# Patient Record
Sex: MALE | Race: WHITE | NOT HISPANIC OR LATINO | URBAN - METROPOLITAN AREA
[De-identification: names, ages, dates, MRNs, and addresses within clinical notes are randomized per-mention and may not be internally consistent; named-entity substitution may affect disease eponyms.]

---

## 2017-03-28 ENCOUNTER — EMERGENCY (EMERGENCY)
Facility: HOSPITAL | Age: 77
LOS: 0 days | Discharge: HOME | End: 2017-03-28

## 2017-04-05 PROBLEM — Z00.00 ENCOUNTER FOR PREVENTIVE HEALTH EXAMINATION: Status: ACTIVE | Noted: 2017-04-05

## 2017-05-25 ENCOUNTER — APPOINTMENT (OUTPATIENT)
Dept: OTOLARYNGOLOGY | Facility: CLINIC | Age: 77
End: 2017-05-25

## 2017-06-27 DIAGNOSIS — R27.0 ATAXIA, UNSPECIFIED: ICD-10-CM

## 2017-06-27 DIAGNOSIS — R47.89 OTHER SPEECH DISTURBANCES: ICD-10-CM

## 2017-06-27 DIAGNOSIS — E11.9 TYPE 2 DIABETES MELLITUS WITHOUT COMPLICATIONS: ICD-10-CM

## 2017-06-27 DIAGNOSIS — Z79.84 LONG TERM (CURRENT) USE OF ORAL HYPOGLYCEMIC DRUGS: ICD-10-CM

## 2017-06-27 DIAGNOSIS — E78.5 HYPERLIPIDEMIA, UNSPECIFIED: ICD-10-CM

## 2017-06-27 DIAGNOSIS — Z86.73 PERSONAL HISTORY OF TRANSIENT ISCHEMIC ATTACK (TIA), AND CEREBRAL INFARCTION WITHOUT RESIDUAL DEFICITS: ICD-10-CM

## 2017-06-27 DIAGNOSIS — Z79.82 LONG TERM (CURRENT) USE OF ASPIRIN: ICD-10-CM

## 2017-06-27 DIAGNOSIS — Z88.0 ALLERGY STATUS TO PENICILLIN: ICD-10-CM

## 2017-06-27 DIAGNOSIS — J32.9 CHRONIC SINUSITIS, UNSPECIFIED: ICD-10-CM

## 2017-06-27 DIAGNOSIS — N40.0 BENIGN PROSTATIC HYPERPLASIA WITHOUT LOWER URINARY TRACT SYMPTOMS: ICD-10-CM

## 2017-10-26 ENCOUNTER — OUTPATIENT (OUTPATIENT)
Dept: OUTPATIENT SERVICES | Facility: HOSPITAL | Age: 77
LOS: 1 days | Discharge: HOME | End: 2017-10-26

## 2017-10-26 DIAGNOSIS — E66.3 OVERWEIGHT: ICD-10-CM

## 2017-10-26 DIAGNOSIS — E78.5 HYPERLIPIDEMIA, UNSPECIFIED: ICD-10-CM

## 2017-10-26 DIAGNOSIS — M13.0 POLYARTHRITIS, UNSPECIFIED: ICD-10-CM

## 2017-10-26 DIAGNOSIS — F41.9 ANXIETY DISORDER, UNSPECIFIED: ICD-10-CM

## 2017-10-26 DIAGNOSIS — N40.0 BENIGN PROSTATIC HYPERPLASIA WITHOUT LOWER URINARY TRACT SYMPTOMS: ICD-10-CM

## 2017-10-26 DIAGNOSIS — E11.9 TYPE 2 DIABETES MELLITUS WITHOUT COMPLICATIONS: ICD-10-CM

## 2018-10-18 ENCOUNTER — OUTPATIENT (OUTPATIENT)
Dept: OUTPATIENT SERVICES | Facility: HOSPITAL | Age: 78
LOS: 1 days | Discharge: HOME | End: 2018-10-18

## 2018-10-18 DIAGNOSIS — E11.9 TYPE 2 DIABETES MELLITUS WITHOUT COMPLICATIONS: ICD-10-CM

## 2018-10-18 DIAGNOSIS — N40.0 BENIGN PROSTATIC HYPERPLASIA WITHOUT LOWER URINARY TRACT SYMPTOMS: ICD-10-CM

## 2018-10-18 DIAGNOSIS — E55.9 VITAMIN D DEFICIENCY, UNSPECIFIED: ICD-10-CM

## 2018-10-18 DIAGNOSIS — E66.3 OVERWEIGHT: ICD-10-CM

## 2018-10-18 DIAGNOSIS — E53.8 DEFICIENCY OF OTHER SPECIFIED B GROUP VITAMINS: ICD-10-CM

## 2018-10-18 DIAGNOSIS — Z00.01 ENCOUNTER FOR GENERAL ADULT MEDICAL EXAMINATION WITH ABNORMAL FINDINGS: ICD-10-CM

## 2019-07-23 ENCOUNTER — OUTPATIENT (OUTPATIENT)
Dept: OUTPATIENT SERVICES | Facility: HOSPITAL | Age: 79
LOS: 1 days | Discharge: HOME | End: 2019-07-23

## 2019-07-23 DIAGNOSIS — Z12.5 ENCOUNTER FOR SCREENING FOR MALIGNANT NEOPLASM OF PROSTATE: ICD-10-CM

## 2021-02-28 ENCOUNTER — TRANSCRIPTION ENCOUNTER (OUTPATIENT)
Age: 81
End: 2021-02-28

## 2022-04-29 ENCOUNTER — INPATIENT (INPATIENT)
Facility: HOSPITAL | Age: 82
LOS: 1 days | Discharge: HOME | End: 2022-05-01
Attending: INTERNAL MEDICINE | Admitting: INTERNAL MEDICINE
Payer: MEDICARE

## 2022-04-29 VITALS
HEART RATE: 120 BPM | TEMPERATURE: 99 F | SYSTOLIC BLOOD PRESSURE: 114 MMHG | OXYGEN SATURATION: 88 % | RESPIRATION RATE: 18 BRPM | DIASTOLIC BLOOD PRESSURE: 56 MMHG | WEIGHT: 240.08 LBS | HEIGHT: 72 IN

## 2022-04-29 DIAGNOSIS — F41.9 ANXIETY DISORDER, UNSPECIFIED: ICD-10-CM

## 2022-04-29 DIAGNOSIS — J18.9 PNEUMONIA, UNSPECIFIED ORGANISM: ICD-10-CM

## 2022-04-29 DIAGNOSIS — N17.9 ACUTE KIDNEY FAILURE, UNSPECIFIED: ICD-10-CM

## 2022-04-29 DIAGNOSIS — Z90.89 ACQUIRED ABSENCE OF OTHER ORGANS: Chronic | ICD-10-CM

## 2022-04-29 DIAGNOSIS — E78.5 HYPERLIPIDEMIA, UNSPECIFIED: ICD-10-CM

## 2022-04-29 DIAGNOSIS — N40.0 BENIGN PROSTATIC HYPERPLASIA WITHOUT LOWER URINARY TRACT SYMPTOMS: ICD-10-CM

## 2022-04-29 DIAGNOSIS — E11.9 TYPE 2 DIABETES MELLITUS WITHOUT COMPLICATIONS: ICD-10-CM

## 2022-04-29 LAB
ALBUMIN SERPL ELPH-MCNC: 4.6 G/DL — SIGNIFICANT CHANGE UP (ref 3.5–5.2)
ALP SERPL-CCNC: 37 U/L — SIGNIFICANT CHANGE UP (ref 30–115)
ALT FLD-CCNC: 34 U/L — SIGNIFICANT CHANGE UP (ref 0–41)
ANION GAP SERPL CALC-SCNC: 17 MMOL/L — HIGH (ref 7–14)
APPEARANCE UR: CLEAR — SIGNIFICANT CHANGE UP
APTT BLD: 36.1 SEC — SIGNIFICANT CHANGE UP (ref 27–39.2)
AST SERPL-CCNC: 47 U/L — HIGH (ref 0–41)
BASE EXCESS BLDV CALC-SCNC: -3.3 MMOL/L — LOW (ref -2–3)
BASOPHILS # BLD AUTO: 0.03 K/UL — SIGNIFICANT CHANGE UP (ref 0–0.2)
BASOPHILS NFR BLD AUTO: 0.2 % — SIGNIFICANT CHANGE UP (ref 0–1)
BILIRUB SERPL-MCNC: 0.5 MG/DL — SIGNIFICANT CHANGE UP (ref 0.2–1.2)
BILIRUB UR-MCNC: NEGATIVE — SIGNIFICANT CHANGE UP
BUN SERPL-MCNC: 28 MG/DL — HIGH (ref 10–20)
CA-I SERPL-SCNC: 1.25 MMOL/L — SIGNIFICANT CHANGE UP (ref 1.15–1.33)
CALCIUM SERPL-MCNC: 10 MG/DL — SIGNIFICANT CHANGE UP (ref 8.5–10.1)
CHLORIDE SERPL-SCNC: 102 MMOL/L — SIGNIFICANT CHANGE UP (ref 98–110)
CO2 SERPL-SCNC: 21 MMOL/L — SIGNIFICANT CHANGE UP (ref 17–32)
COLOR SPEC: YELLOW — SIGNIFICANT CHANGE UP
CREAT SERPL-MCNC: 2 MG/DL — HIGH (ref 0.7–1.5)
DIFF PNL FLD: NEGATIVE — SIGNIFICANT CHANGE UP
EGFR: 33 ML/MIN/1.73M2 — LOW
EOSINOPHIL # BLD AUTO: 0.01 K/UL — SIGNIFICANT CHANGE UP (ref 0–0.7)
EOSINOPHIL NFR BLD AUTO: 0.1 % — SIGNIFICANT CHANGE UP (ref 0–8)
FLUAV AG NPH QL: SIGNIFICANT CHANGE UP
FLUBV AG NPH QL: SIGNIFICANT CHANGE UP
GAS PNL BLDV: 135 MMOL/L — LOW (ref 136–145)
GAS PNL BLDV: SIGNIFICANT CHANGE UP
GLUCOSE BLDC GLUCOMTR-MCNC: 107 MG/DL — HIGH (ref 70–99)
GLUCOSE BLDC GLUCOMTR-MCNC: 132 MG/DL — HIGH (ref 70–99)
GLUCOSE SERPL-MCNC: 182 MG/DL — HIGH (ref 70–99)
GLUCOSE UR QL: 250 MG/DL
HCO3 BLDV-SCNC: 23 MMOL/L — SIGNIFICANT CHANGE UP (ref 22–29)
HCT VFR BLD CALC: 46.4 % — SIGNIFICANT CHANGE UP (ref 42–52)
HCT VFR BLDA CALC: 49 % — SIGNIFICANT CHANGE UP (ref 39–51)
HGB BLD CALC-MCNC: 16.2 G/DL — SIGNIFICANT CHANGE UP (ref 12.6–17.4)
HGB BLD-MCNC: 16.1 G/DL — SIGNIFICANT CHANGE UP (ref 14–18)
IMM GRANULOCYTES NFR BLD AUTO: 0.6 % — HIGH (ref 0.1–0.3)
INR BLD: 1.15 RATIO — SIGNIFICANT CHANGE UP (ref 0.65–1.3)
KETONES UR-MCNC: NEGATIVE — SIGNIFICANT CHANGE UP
LACTATE BLDV-MCNC: 5.3 MMOL/L — CRITICAL HIGH (ref 0.5–2)
LACTATE SERPL-SCNC: 3.9 MMOL/L — HIGH (ref 0.7–2)
LACTATE SERPL-SCNC: 4.5 MMOL/L — CRITICAL HIGH (ref 0.7–2)
LEUKOCYTE ESTERASE UR-ACNC: NEGATIVE — SIGNIFICANT CHANGE UP
LYMPHOCYTES # BLD AUTO: 0.84 K/UL — LOW (ref 1.2–3.4)
LYMPHOCYTES # BLD AUTO: 6.3 % — LOW (ref 20.5–51.1)
MCHC RBC-ENTMCNC: 31.3 PG — HIGH (ref 27–31)
MCHC RBC-ENTMCNC: 34.7 G/DL — SIGNIFICANT CHANGE UP (ref 32–37)
MCV RBC AUTO: 90.3 FL — SIGNIFICANT CHANGE UP (ref 80–94)
MONOCYTES # BLD AUTO: 1.28 K/UL — HIGH (ref 0.1–0.6)
MONOCYTES NFR BLD AUTO: 9.6 % — HIGH (ref 1.7–9.3)
NEUTROPHILS # BLD AUTO: 11.11 K/UL — HIGH (ref 1.4–6.5)
NEUTROPHILS NFR BLD AUTO: 83.2 % — HIGH (ref 42.2–75.2)
NITRITE UR-MCNC: NEGATIVE — SIGNIFICANT CHANGE UP
NRBC # BLD: 0 /100 WBCS — SIGNIFICANT CHANGE UP (ref 0–0)
PCO2 BLDV: 43 MMHG — SIGNIFICANT CHANGE UP (ref 42–55)
PH BLDV: 7.33 — SIGNIFICANT CHANGE UP (ref 7.32–7.43)
PH UR: 6 — SIGNIFICANT CHANGE UP (ref 5–8)
PLATELET # BLD AUTO: 308 K/UL — SIGNIFICANT CHANGE UP (ref 130–400)
PO2 BLDV: 32 MMHG — SIGNIFICANT CHANGE UP
POTASSIUM BLDV-SCNC: 4.3 MMOL/L — SIGNIFICANT CHANGE UP (ref 3.5–5.1)
POTASSIUM SERPL-MCNC: 4.8 MMOL/L — SIGNIFICANT CHANGE UP (ref 3.5–5)
POTASSIUM SERPL-SCNC: 4.8 MMOL/L — SIGNIFICANT CHANGE UP (ref 3.5–5)
PROT SERPL-MCNC: 7.4 G/DL — SIGNIFICANT CHANGE UP (ref 6–8)
PROT UR-MCNC: NEGATIVE MG/DL — SIGNIFICANT CHANGE UP
PROTHROM AB SERPL-ACNC: 13.2 SEC — HIGH (ref 9.95–12.87)
RBC # BLD: 5.14 M/UL — SIGNIFICANT CHANGE UP (ref 4.7–6.1)
RBC # FLD: 12.9 % — SIGNIFICANT CHANGE UP (ref 11.5–14.5)
RSV RNA NPH QL NAA+NON-PROBE: SIGNIFICANT CHANGE UP
SAO2 % BLDV: 52.5 % — SIGNIFICANT CHANGE UP
SARS-COV-2 RNA SPEC QL NAA+PROBE: SIGNIFICANT CHANGE UP
SODIUM SERPL-SCNC: 140 MMOL/L — SIGNIFICANT CHANGE UP (ref 135–146)
SP GR SPEC: >=1.03 (ref 1.01–1.03)
UROBILINOGEN FLD QL: 0.2 MG/DL — SIGNIFICANT CHANGE UP
WBC # BLD: 13.35 K/UL — HIGH (ref 4.8–10.8)
WBC # FLD AUTO: 13.35 K/UL — HIGH (ref 4.8–10.8)

## 2022-04-29 PROCEDURE — 70450 CT HEAD/BRAIN W/O DYE: CPT | Mod: 26,MA

## 2022-04-29 PROCEDURE — 71045 X-RAY EXAM CHEST 1 VIEW: CPT | Mod: 26

## 2022-04-29 PROCEDURE — 99285 EMERGENCY DEPT VISIT HI MDM: CPT | Mod: FS,CS

## 2022-04-29 PROCEDURE — 93010 ELECTROCARDIOGRAM REPORT: CPT

## 2022-04-29 RX ORDER — BENZTROPINE MESYLATE 1 MG
1 TABLET ORAL
Qty: 0 | Refills: 0 | DISCHARGE

## 2022-04-29 RX ORDER — DEXTROSE 50 % IN WATER 50 %
15 SYRINGE (ML) INTRAVENOUS ONCE
Refills: 0 | Status: DISCONTINUED | OUTPATIENT
Start: 2022-04-29 | End: 2022-05-01

## 2022-04-29 RX ORDER — SIMVASTATIN 20 MG/1
1 TABLET, FILM COATED ORAL
Qty: 0 | Refills: 0 | DISCHARGE

## 2022-04-29 RX ORDER — SODIUM CHLORIDE 9 MG/ML
1000 INJECTION, SOLUTION INTRAVENOUS
Refills: 0 | Status: DISCONTINUED | OUTPATIENT
Start: 2022-04-29 | End: 2022-05-01

## 2022-04-29 RX ORDER — BENZTROPINE MESYLATE 1 MG
1 TABLET ORAL
Refills: 0 | Status: DISCONTINUED | OUTPATIENT
Start: 2022-04-29 | End: 2022-05-01

## 2022-04-29 RX ORDER — SIMVASTATIN 20 MG/1
20 TABLET, FILM COATED ORAL AT BEDTIME
Refills: 0 | Status: DISCONTINUED | OUTPATIENT
Start: 2022-04-29 | End: 2022-05-01

## 2022-04-29 RX ORDER — ESCITALOPRAM OXALATE 10 MG/1
10 TABLET, FILM COATED ORAL DAILY
Refills: 0 | Status: DISCONTINUED | OUTPATIENT
Start: 2022-04-29 | End: 2022-05-01

## 2022-04-29 RX ORDER — ESCITALOPRAM OXALATE 10 MG/1
1 TABLET, FILM COATED ORAL
Qty: 0 | Refills: 0 | DISCHARGE

## 2022-04-29 RX ORDER — CEFTRIAXONE 500 MG/1
1000 INJECTION, POWDER, FOR SOLUTION INTRAMUSCULAR; INTRAVENOUS ONCE
Refills: 0 | Status: COMPLETED | OUTPATIENT
Start: 2022-04-29 | End: 2022-04-29

## 2022-04-29 RX ORDER — DEXTROSE 50 % IN WATER 50 %
25 SYRINGE (ML) INTRAVENOUS ONCE
Refills: 0 | Status: DISCONTINUED | OUTPATIENT
Start: 2022-04-29 | End: 2022-05-01

## 2022-04-29 RX ORDER — ACETAMINOPHEN 500 MG
650 TABLET ORAL EVERY 6 HOURS
Refills: 0 | Status: DISCONTINUED | OUTPATIENT
Start: 2022-04-29 | End: 2022-05-01

## 2022-04-29 RX ORDER — AZITHROMYCIN 500 MG/1
500 TABLET, FILM COATED ORAL ONCE
Refills: 0 | Status: COMPLETED | OUTPATIENT
Start: 2022-04-29 | End: 2022-04-29

## 2022-04-29 RX ORDER — FENOFIBRATE,MICRONIZED 130 MG
1 CAPSULE ORAL
Qty: 0 | Refills: 0 | DISCHARGE

## 2022-04-29 RX ORDER — CEFTRIAXONE 500 MG/1
1000 INJECTION, POWDER, FOR SOLUTION INTRAMUSCULAR; INTRAVENOUS EVERY 24 HOURS
Refills: 0 | Status: DISCONTINUED | OUTPATIENT
Start: 2022-04-29 | End: 2022-05-01

## 2022-04-29 RX ORDER — GLUCAGON INJECTION, SOLUTION 0.5 MG/.1ML
1 INJECTION, SOLUTION SUBCUTANEOUS ONCE
Refills: 0 | Status: DISCONTINUED | OUTPATIENT
Start: 2022-04-29 | End: 2022-05-01

## 2022-04-29 RX ORDER — PANTOPRAZOLE SODIUM 20 MG/1
40 TABLET, DELAYED RELEASE ORAL
Refills: 0 | Status: DISCONTINUED | OUTPATIENT
Start: 2022-04-29 | End: 2022-05-01

## 2022-04-29 RX ORDER — SACCHAROMYCES BOULARDII 250 MG
250 POWDER IN PACKET (EA) ORAL
Refills: 0 | Status: DISCONTINUED | OUTPATIENT
Start: 2022-04-29 | End: 2022-05-01

## 2022-04-29 RX ORDER — TAMSULOSIN HYDROCHLORIDE 0.4 MG/1
0.4 CAPSULE ORAL AT BEDTIME
Refills: 0 | Status: DISCONTINUED | OUTPATIENT
Start: 2022-04-29 | End: 2022-05-01

## 2022-04-29 RX ORDER — DEXTROSE 50 % IN WATER 50 %
12.5 SYRINGE (ML) INTRAVENOUS ONCE
Refills: 0 | Status: DISCONTINUED | OUTPATIENT
Start: 2022-04-29 | End: 2022-05-01

## 2022-04-29 RX ORDER — TAMSULOSIN HYDROCHLORIDE 0.4 MG/1
1 CAPSULE ORAL
Qty: 0 | Refills: 0 | DISCHARGE

## 2022-04-29 RX ORDER — CHLORHEXIDINE GLUCONATE 213 G/1000ML
1 SOLUTION TOPICAL
Refills: 0 | Status: DISCONTINUED | OUTPATIENT
Start: 2022-04-29 | End: 2022-05-01

## 2022-04-29 RX ORDER — INSULIN LISPRO 100/ML
VIAL (ML) SUBCUTANEOUS
Refills: 0 | Status: DISCONTINUED | OUTPATIENT
Start: 2022-04-29 | End: 2022-05-01

## 2022-04-29 RX ORDER — AZITHROMYCIN 500 MG/1
500 TABLET, FILM COATED ORAL EVERY 24 HOURS
Refills: 0 | Status: DISCONTINUED | OUTPATIENT
Start: 2022-04-29 | End: 2022-05-01

## 2022-04-29 RX ORDER — SODIUM CHLORIDE 9 MG/ML
2400 INJECTION, SOLUTION INTRAVENOUS ONCE
Refills: 0 | Status: COMPLETED | OUTPATIENT
Start: 2022-04-29 | End: 2022-04-29

## 2022-04-29 RX ORDER — HEPARIN SODIUM 5000 [USP'U]/ML
5000 INJECTION INTRAVENOUS; SUBCUTANEOUS EVERY 8 HOURS
Refills: 0 | Status: DISCONTINUED | OUTPATIENT
Start: 2022-04-29 | End: 2022-05-01

## 2022-04-29 RX ORDER — METFORMIN HYDROCHLORIDE 850 MG/1
1 TABLET ORAL
Qty: 0 | Refills: 0 | DISCHARGE

## 2022-04-29 RX ORDER — ACETAMINOPHEN 500 MG
975 TABLET ORAL ONCE
Refills: 0 | Status: COMPLETED | OUTPATIENT
Start: 2022-04-29 | End: 2022-04-29

## 2022-04-29 RX ORDER — FENOFIBRATE,MICRONIZED 130 MG
145 CAPSULE ORAL DAILY
Refills: 0 | Status: DISCONTINUED | OUTPATIENT
Start: 2022-04-29 | End: 2022-05-01

## 2022-04-29 RX ADMIN — SODIUM CHLORIDE 100 MILLILITER(S): 9 INJECTION, SOLUTION INTRAVENOUS at 21:52

## 2022-04-29 RX ADMIN — TAMSULOSIN HYDROCHLORIDE 0.4 MILLIGRAM(S): 0.4 CAPSULE ORAL at 21:53

## 2022-04-29 RX ADMIN — SODIUM CHLORIDE 2400 MILLILITER(S): 9 INJECTION, SOLUTION INTRAVENOUS at 08:55

## 2022-04-29 RX ADMIN — Medication 145 MILLIGRAM(S): at 21:53

## 2022-04-29 RX ADMIN — Medication 975 MILLIGRAM(S): at 10:01

## 2022-04-29 RX ADMIN — SODIUM CHLORIDE 100 MILLILITER(S): 9 INJECTION, SOLUTION INTRAVENOUS at 16:12

## 2022-04-29 RX ADMIN — Medication 975 MILLIGRAM(S): at 08:56

## 2022-04-29 RX ADMIN — SIMVASTATIN 20 MILLIGRAM(S): 20 TABLET, FILM COATED ORAL at 21:59

## 2022-04-29 RX ADMIN — HEPARIN SODIUM 5000 UNIT(S): 5000 INJECTION INTRAVENOUS; SUBCUTANEOUS at 21:53

## 2022-04-29 RX ADMIN — Medication 1 MILLIGRAM(S): at 17:35

## 2022-04-29 RX ADMIN — AZITHROMYCIN 255 MILLIGRAM(S): 500 TABLET, FILM COATED ORAL at 17:35

## 2022-04-29 RX ADMIN — CEFTRIAXONE 100 MILLIGRAM(S): 500 INJECTION, POWDER, FOR SOLUTION INTRAMUSCULAR; INTRAVENOUS at 17:37

## 2022-04-29 RX ADMIN — ESCITALOPRAM OXALATE 10 MILLIGRAM(S): 10 TABLET, FILM COATED ORAL at 21:53

## 2022-04-29 RX ADMIN — AZITHROMYCIN 255 MILLIGRAM(S): 500 TABLET, FILM COATED ORAL at 09:39

## 2022-04-29 RX ADMIN — CEFTRIAXONE 100 MILLIGRAM(S): 500 INJECTION, POWDER, FOR SOLUTION INTRAMUSCULAR; INTRAVENOUS at 08:55

## 2022-04-29 RX ADMIN — Medication 250 MILLIGRAM(S): at 17:35

## 2022-04-29 NOTE — H&P ADULT - NSHPLABSRESULTS_GEN_ALL_CORE
16.1   13.35 )-----------( 308      ( 29 Apr 2022 08:08 )             46.4       04-29    140  |  102  |  28<H>  ----------------------------<  182<H>  4.8   |  21  |  2.0<H>    Ca    10.0      29 Apr 2022 08:08    TPro  7.4  /  Alb  4.6  /  TBili  0.5  /  DBili  x   /  AST  47<H>  /  ALT  34  /  AlkPhos  37  04-29                  Urinalysis Basic - ( 29 Apr 2022 08:08 )    Color: Yellow / Appearance: Clear / SG: >=1.030 / pH: x  Gluc: x / Ketone: Negative  / Bili: Negative / Urobili: 0.2 mg/dL   Blood: x / Protein: Negative mg/dL / Nitrite: Negative   Leuk Esterase: Negative / RBC: x / WBC x   Sq Epi: x / Non Sq Epi: x / Bacteria: x        PT/INR - ( 29 Apr 2022 08:08 )   PT: 13.20 sec;   INR: 1.15 ratio         PTT - ( 29 Apr 2022 08:08 )  PTT:36.1 sec    Lactate Trend  04-29 @ 10:30 Lactate:4.5       < from: Xray Chest 1 View- PORTABLE-Urgent (04.29.22 @ 09:12) >    Impression:    Extensive infiltrates in the right lung    --- End of Report ---    < end of copied text >    < from: CT Head No Cont (04.29.22 @ 08:38) >    IMPRESSION:    No acute intracranial pathology.    Stable mild ventriculomegaly.    --- End of Report ---    < end of copied text >

## 2022-04-29 NOTE — ED PROVIDER NOTE - OBJECTIVE STATEMENT
82 yo M pmhx DM, BPH presenting to the ED for evaluation of generalized weakness, chills since last night. Pt reports this morning he could not lift himself out of bed, at baseline ambulated and functions unassisted. Pt states he couldn't get out of bed so his wife called 911. Admits to cough. Pt denies any fever, chest pain, sob, headache, dizziness, numbness/tinging, nausea, vomiting, abd pain.

## 2022-04-29 NOTE — H&P ADULT - NSHPPHYSICALEXAM_GEN_ALL_CORE
T(C): 36.2 (04-29-22 @ 13:07), Max: 39.4 (04-29-22 @ 08:13)  HR: 77 (04-29-22 @ 13:07) (77 - 120)  BP: 109/61 (04-29-22 @ 13:07) (105/55 - 128/61)  RR: 20 (04-29-22 @ 13:07) (18 - 20)  SpO2: 95% (04-29-22 @ 13:07) (88% - 97%)    PHYSICAL EXAM:  GENERAL: NAD, AOx3   HEAD:  Atraumatic, Normocephalic  NECK: Supple  CHEST/LUNG: Clear to auscultation bilaterally; No rales or wheezing, mild right sided crackles  HEART: S1,S2 Regular rate and rhythm; No murmurs, rubs, or gallops  ABDOMEN: Soft, nontender, nondistended, no rebound tenderness; +BS   EXTREMITIES:  2+ peripheral pulses bilaterally and symmetrically, no clubbing, cyanosis, or edema  SKIN: No rashes or lesions

## 2022-04-29 NOTE — ED PROVIDER NOTE - NS ED ATTENDING STATEMENT MOD
This was a shared visit with the ANDREIA. I reviewed and verified the documentation and independently performed the documented:

## 2022-04-29 NOTE — ED PROVIDER NOTE - PHYSICAL EXAMINATION
GENERAL: Well-nourished, Well-developed. NAD.  HEAD: No visible or palpable bumps or hematomas. No ecchymosis behind ears B/L.  Eyes: PERRLA, EOMI. No asymmetry. No nystagmus. No conjunctival injection. Non-icteric sclera.  ENMT: MMM.   Neck: Supple. FROM  CVS: Normal S1,S2. No murmurs appreciated on auscultation   RESP: No use of accessory muscles. Chest rise symmetrical with good expansion. (+)R lung decreased BS  GI: Normal auscultation of bowel sounds in all 4 quadrants. Soft, Nontender, Nondistended. No guarding or rebound tenderness. No CVAT B/L.  Skin: Warm, Dry. No rashes or lesions. Good cap refill < 2 sec B/L.  EXT: Radial and pedal pulses present B/L. No calf tenderness or swelling B/L. No palpable cords. No pedal edema B/L.  Neuro: AA&O x 3. Sensation grossly intact. Strength 5/5 B/L. Gait within normal limits.

## 2022-04-29 NOTE — ED PROVIDER NOTE - CLINICAL SUMMARY MEDICAL DECISION MAKING FREE TEXT BOX
Patient presented with generalized weakness, cough, decreased appetite. On arrival to ED patient hypoxic on RA which improved with NC, and febrile with signs of sepsis. Tx for sepsis with broad spectrum abx, IVF. Obtained labs which revealed (+) mild leukocytosis and elevated lactate consistent with sepsis, as well as DEWAYNE. CXR showed (+) R PNA which is likely etiology. Remained stable on O2 during ED course. Will require admission for further management given the above. Patient agreeable with plan. HD stable at time of admission.

## 2022-04-29 NOTE — ED PROVIDER NOTE - NSICDXPASTMEDICALHX_GEN_ALL_CORE_FT
PAST MEDICAL HISTORY:  Anxiety and depression     BPH (benign prostatic hyperplasia)     DM (diabetes mellitus), type 2     HLD (hyperlipidemia)

## 2022-04-29 NOTE — ED PROVIDER NOTE - CARE PLAN
1 Principal Discharge DX:	Pneumonia  Secondary Diagnosis:	Hypoxia  Secondary Diagnosis:	Sepsis   Principal Discharge DX:	Pneumonia  Secondary Diagnosis:	Hypoxia  Secondary Diagnosis:	Sepsis  Secondary Diagnosis:	DEWAYNE (acute kidney injury)

## 2022-04-29 NOTE — ED PROVIDER NOTE - NS ED ROS FT
Constitutional: (-) fever (+) malaise (-) diaphoresis (+) chills  Eyes: (-) visual changes (-) eye pain (-) eye discharge (-) photophobia (-) FB sensation  ENMT: (-) nasal or chest congestion (-) runny nose (-) sore throat (-) hoarseness  (-) hearing changes (-) ear pain (-) ear discharge or infections (-) neck pain (-) neck stiffness  Cardiac: (-) chest pain  (-) palpitations (-) syncope (-) edema  Respiratory: (+) cough (-) SOB (-) CANCINO  GI: (-) nausea (-) vomiting (-) diarrhea (-) abdominal pain  : (-) dysuria (-) increased frequency  (-) hematuria (-) incontinence  MS: (-) back pain (-) myalgia (-) muscle weakness (-)  joint pain  Neuro: (-) headache (-) dizziness (-) numbness/tingling to extremities B/L (-) weakness  Skin: (-) rash (-) laceration    Except as documented in the HPI, all other systems are negative.

## 2022-04-29 NOTE — ED ADULT TRIAGE NOTE - CHIEF COMPLAINT QUOTE
BIBA, as per EMS "pt fell out of bed, face down, could not get up, unable to ambulate (different from baseline). O2 stat was 87% on RA". NRB placed in field. Denies head trauma, denies LOC, Not on blood thinners BIBA, as per EMS "pt fell out of bed, face down, could not get up, unable to ambulate (different from baseline). O2 stat was 87% on RA". NRB placed in field. Denies head trauma, denies LOC, Not on blood thinners. pt tachy, febrile and low O2 upon ED arrival.

## 2022-04-29 NOTE — ED ADULT NURSE NOTE - CHIEF COMPLAINT QUOTE
BIBA, as per EMS "pt fell out of bed, face down, could not get up, unable to ambulate (different from baseline). O2 stat was 87% on RA". NRB placed in field. Denies head trauma, denies LOC, Not on blood thinners. pt tachy, febrile and low O2 upon ED arrival.

## 2022-04-29 NOTE — PATIENT PROFILE ADULT - FALL HARM RISK - HARM RISK INTERVENTIONS

## 2022-04-29 NOTE — ED PROVIDER NOTE - PROGRESS NOTE DETAILS
MUNIR: Patient borderline febrile by oral temp, tachycardic to 110-120, hypoxic to low 90s on RA. Likely septic. Will order 30cc/kg IVF bolus (not fluid overloaded on exam and no hx CHF), broad spectrum abx targeting possible pulmonary source as patient states he was coughing last night and no other obvious sources on exam - abd non-tender, no urinary symptoms, non-meningitic, no rashs/skin lesions.

## 2022-04-29 NOTE — H&P ADULT - ASSESSMENT
ASSESSMENT: Pt is a 81M w/ PMH DMT2, BPH, HLD being admitted for PNA and DEWAYNE.   In ED, pt has tmax: 103 w/ WBC: 13.3. BP: 106/56, HR: 91, SPO2 88% on RA --> 97% on 3L. Lactate: 4.5. BUN/cr: 28/2.0. CTH: (-), CXR: extensive infiltrate in R lung. Pt was given 500mg IV zithromax, 1g IV rocephin, 2400cc LR, and 975mg Tylenol.       PLAN: Case d/w Dr. Puente  - Admit to inpatient level of care - medicine  - AM labs  - Monitor vitals  - CHG bath  - Ambulate w/ assistance  - PT consult   - Consistent carb/lowfat diet  - VTE ppx: SQ heparin  - GI ppx: protonix   - Continue cogentin

## 2022-04-30 LAB
A1C WITH ESTIMATED AVERAGE GLUCOSE RESULT: 6.6 % — HIGH (ref 4–5.6)
ALBUMIN SERPL ELPH-MCNC: 3.7 G/DL — SIGNIFICANT CHANGE UP (ref 3.5–5.2)
ALP SERPL-CCNC: 41 U/L — SIGNIFICANT CHANGE UP (ref 30–115)
ALT FLD-CCNC: 87 U/L — HIGH (ref 0–41)
ANION GAP SERPL CALC-SCNC: 14 MMOL/L — SIGNIFICANT CHANGE UP (ref 7–14)
AST SERPL-CCNC: 252 U/L — HIGH (ref 0–41)
BILIRUB SERPL-MCNC: 0.4 MG/DL — SIGNIFICANT CHANGE UP (ref 0.2–1.2)
BUN SERPL-MCNC: 24 MG/DL — HIGH (ref 10–20)
CALCIUM SERPL-MCNC: 8.7 MG/DL — SIGNIFICANT CHANGE UP (ref 8.5–10.1)
CHLORIDE SERPL-SCNC: 103 MMOL/L — SIGNIFICANT CHANGE UP (ref 98–110)
CO2 SERPL-SCNC: 22 MMOL/L — SIGNIFICANT CHANGE UP (ref 17–32)
CREAT SERPL-MCNC: 1.3 MG/DL — SIGNIFICANT CHANGE UP (ref 0.7–1.5)
CULTURE RESULTS: NO GROWTH — SIGNIFICANT CHANGE UP
EGFR: 56 ML/MIN/1.73M2 — LOW
ESTIMATED AVERAGE GLUCOSE: 143 MG/DL — HIGH (ref 68–114)
GLUCOSE BLDC GLUCOMTR-MCNC: 107 MG/DL — HIGH (ref 70–99)
GLUCOSE BLDC GLUCOMTR-MCNC: 113 MG/DL — HIGH (ref 70–99)
GLUCOSE BLDC GLUCOMTR-MCNC: 122 MG/DL — HIGH (ref 70–99)
GLUCOSE BLDC GLUCOMTR-MCNC: 126 MG/DL — HIGH (ref 70–99)
GLUCOSE SERPL-MCNC: 126 MG/DL — HIGH (ref 70–99)
HCT VFR BLD CALC: 41.4 % — LOW (ref 42–52)
HGB BLD-MCNC: 14.2 G/DL — SIGNIFICANT CHANGE UP (ref 14–18)
MCHC RBC-ENTMCNC: 31.1 PG — HIGH (ref 27–31)
MCHC RBC-ENTMCNC: 34.3 G/DL — SIGNIFICANT CHANGE UP (ref 32–37)
MCV RBC AUTO: 90.8 FL — SIGNIFICANT CHANGE UP (ref 80–94)
NRBC # BLD: 0 /100 WBCS — SIGNIFICANT CHANGE UP (ref 0–0)
PLATELET # BLD AUTO: 218 K/UL — SIGNIFICANT CHANGE UP (ref 130–400)
POTASSIUM SERPL-MCNC: 4.1 MMOL/L — SIGNIFICANT CHANGE UP (ref 3.5–5)
POTASSIUM SERPL-SCNC: 4.1 MMOL/L — SIGNIFICANT CHANGE UP (ref 3.5–5)
PROT SERPL-MCNC: 6 G/DL — SIGNIFICANT CHANGE UP (ref 6–8)
RBC # BLD: 4.56 M/UL — LOW (ref 4.7–6.1)
RBC # FLD: 13.7 % — SIGNIFICANT CHANGE UP (ref 11.5–14.5)
SODIUM SERPL-SCNC: 139 MMOL/L — SIGNIFICANT CHANGE UP (ref 135–146)
SPECIMEN SOURCE: SIGNIFICANT CHANGE UP
WBC # BLD: 13.75 K/UL — HIGH (ref 4.8–10.8)
WBC # FLD AUTO: 13.75 K/UL — HIGH (ref 4.8–10.8)

## 2022-04-30 PROCEDURE — 99223 1ST HOSP IP/OBS HIGH 75: CPT

## 2022-04-30 RX ADMIN — ESCITALOPRAM OXALATE 10 MILLIGRAM(S): 10 TABLET, FILM COATED ORAL at 12:27

## 2022-04-30 RX ADMIN — Medication 1 MILLIGRAM(S): at 17:49

## 2022-04-30 RX ADMIN — HEPARIN SODIUM 5000 UNIT(S): 5000 INJECTION INTRAVENOUS; SUBCUTANEOUS at 21:01

## 2022-04-30 RX ADMIN — TAMSULOSIN HYDROCHLORIDE 0.4 MILLIGRAM(S): 0.4 CAPSULE ORAL at 21:00

## 2022-04-30 RX ADMIN — Medication 1 MILLIGRAM(S): at 05:13

## 2022-04-30 RX ADMIN — Medication 250 MILLIGRAM(S): at 05:12

## 2022-04-30 RX ADMIN — Medication 250 MILLIGRAM(S): at 17:49

## 2022-04-30 RX ADMIN — HEPARIN SODIUM 5000 UNIT(S): 5000 INJECTION INTRAVENOUS; SUBCUTANEOUS at 13:42

## 2022-04-30 RX ADMIN — Medication 145 MILLIGRAM(S): at 12:28

## 2022-04-30 RX ADMIN — CEFTRIAXONE 100 MILLIGRAM(S): 500 INJECTION, POWDER, FOR SOLUTION INTRAMUSCULAR; INTRAVENOUS at 17:57

## 2022-04-30 RX ADMIN — AZITHROMYCIN 255 MILLIGRAM(S): 500 TABLET, FILM COATED ORAL at 18:39

## 2022-04-30 RX ADMIN — SIMVASTATIN 20 MILLIGRAM(S): 20 TABLET, FILM COATED ORAL at 21:00

## 2022-04-30 RX ADMIN — CHLORHEXIDINE GLUCONATE 1 APPLICATION(S): 213 SOLUTION TOPICAL at 05:13

## 2022-04-30 RX ADMIN — HEPARIN SODIUM 5000 UNIT(S): 5000 INJECTION INTRAVENOUS; SUBCUTANEOUS at 05:12

## 2022-04-30 NOTE — CHART NOTE - NSCHARTNOTEFT_GEN_A_CORE
Patient seen and examined throughout the course of the day.   Feeling better.    Vital Signs Last 24 Hrs  T(C): 36.1 (30 Apr 2022 05:18), Max: 37.1 (29 Apr 2022 13:07)  T(F): 97 (30 Apr 2022 05:18), Max: 98.7 (29 Apr 2022 13:07)  HR: 65 (30 Apr 2022 05:18) (65 - 79)  BP: 136/63 (30 Apr 2022 05:18) (109/61 - 136/63)  BP(mean): --  RR: 18 (30 Apr 2022 05:18) (18 - 20)  SpO2: 96% (30 Apr 2022 05:18) (95% - 96%)    Cr back to baseline 1.3  WBC improving  Pulm consult pend     Results of Labs/Imaging discussed as well as patient's plan.    All patient's/family questions answered.  Patient and family encouraged to contact PA with any further issues.

## 2022-04-30 NOTE — PHYSICAL THERAPY INITIAL EVALUATION ADULT - GAIT DEVIATIONS NOTED, PT EVAL
decreased oksana/decreased step length/decreased stride length/increased stride width/decreased weight-shifting ability

## 2022-04-30 NOTE — PHYSICAL THERAPY INITIAL EVALUATION ADULT - ADDITIONAL COMMENTS
Pt was independent prior to hospital admission, lives in 2 story house with 10 steps, L railing ascending, 3 steps to enter house, b/l railings. Pt lives with wife, states he does not use AD or require assistance with ADLs.

## 2022-04-30 NOTE — PHYSICAL THERAPY INITIAL EVALUATION ADULT - GENERAL OBSERVATIONS, REHAB EVAL
\ 
Name: Earle Youssef   Sex: male   : 2014 [address] 
[phone] (home) [phone] (work) Current Immunizations: 
Immunization History Administered Date(s) Administered  DTaP-IPV 2020  MMRV 2020 Allergies: Allergies as of 2020  (No Known Allergies) 9:45 - 10:15 Chart reviewed. Patient available to be seen for physical therapy, +3L O2 NC, +IV, denies pain, confirmed with MERVAT Felder.

## 2022-05-01 ENCOUNTER — TRANSCRIPTION ENCOUNTER (OUTPATIENT)
Age: 82
End: 2022-05-01

## 2022-05-01 VITALS — OXYGEN SATURATION: 93 %

## 2022-05-01 LAB — GLUCOSE BLDC GLUCOMTR-MCNC: 130 MG/DL — HIGH (ref 70–99)

## 2022-05-01 RX ORDER — ACETAMINOPHEN 500 MG
2 TABLET ORAL
Qty: 0 | Refills: 0 | DISCHARGE
Start: 2022-05-01

## 2022-05-01 RX ORDER — CEFDINIR 250 MG/5ML
1 POWDER, FOR SUSPENSION ORAL
Qty: 20 | Refills: 0
Start: 2022-05-01 | End: 2022-05-10

## 2022-05-01 RX ORDER — SACCHAROMYCES BOULARDII 250 MG
1 POWDER IN PACKET (EA) ORAL
Qty: 20 | Refills: 0
Start: 2022-05-01 | End: 2022-05-10

## 2022-05-01 RX ADMIN — PANTOPRAZOLE SODIUM 40 MILLIGRAM(S): 20 TABLET, DELAYED RELEASE ORAL at 07:40

## 2022-05-01 RX ADMIN — Medication 250 MILLIGRAM(S): at 05:10

## 2022-05-01 RX ADMIN — CHLORHEXIDINE GLUCONATE 1 APPLICATION(S): 213 SOLUTION TOPICAL at 05:10

## 2022-05-01 RX ADMIN — HEPARIN SODIUM 5000 UNIT(S): 5000 INJECTION INTRAVENOUS; SUBCUTANEOUS at 05:10

## 2022-05-01 RX ADMIN — Medication 1 MILLIGRAM(S): at 05:10

## 2022-05-01 NOTE — DISCHARGE NOTE PROVIDER - PROVIDER TOKENS
PROVIDER:[TOKEN:[[id number]],SCHEDULEDAPPT:[05/04/2022],SCHEDULEDAPPTTIME:[01:00 PM],ESTABLISHEDPATIENT:[T]]

## 2022-05-01 NOTE — DISCHARGE NOTE NURSING/CASE MANAGEMENT/SOCIAL WORK - PATIENT PORTAL LINK FT
You can access the FollowMyHealth Patient Portal offered by [facility] by registering at the following website: [email or web address]. By joining [employer]s FollowMyHealth portal, you will also be able to view your health information using other applications (apps) compatible with our system.

## 2022-05-01 NOTE — DISCHARGE NOTE PROVIDER - CARE PROVIDER_API CALL
Shukri Puente)  Internal Medicine  [address] [place] [address], [address]  [place] [address]  Phone: [phone]  Fax: [phone]  Established Patient  Scheduled Appointment: 05/04/2022 01:00 PM

## 2022-05-01 NOTE — DISCHARGE NOTE PROVIDER - NSDCMRMEDTOKEN_GEN_ALL_CORE_FT
acetaminophen 325 mg oral tablet: 2 tab(s) orally every 6 hours, As needed, Temp greater or equal to 38C (100.4F), Mild Pain (1 - 3)  benztropine 1 mg oral tablet: 1 tab(s) orally 2 times a day  cefdinir 300 mg oral capsule: 1 cap(s) orally 2 times a day   escitalopram 10 mg oral tablet: 1 tab(s) orally once a day  fenofibrate 145 mg oral tablet: 1 tab(s) orally once a day  Flomax 0.4 mg oral capsule: 1 cap(s) orally once a day  metFORMIN 500 mg oral tablet: 1 tab(s) orally 3 times a day (with meals)  saccharomyces boulardii lyo 250 mg oral capsule: 1 cap(s) orally 2 times a day  simvastatin 20 mg oral tablet: 1 tab(s) orally once a day (at bedtime)

## 2022-05-01 NOTE — PROGRESS NOTE ADULT - SUBJECTIVE AND OBJECTIVE BOX
11M w/ PMH DMT2, BPH, HLD being admitted for PNA and DEWAYNE. Pt reports weakness and chills/shaking upon waking up this morning. Pt also reports dry cough overnight. Denies SOB. Denies HA, dizziness, SOB, chest pain, NVD, abdominal pain, change in urination and change in BM.     In ED, pt has tmax: 103 w/ WBC: 13.3. BP: 106/56, HR: 91, SPO2 88% on RA --> 97% on 3L. Lactate: 4.5. BUN/cr: 28/2.0. CTH: (-), CXR: extensive infiltrate in R lung. Pt was given 500mg IV zithromax, 1g IV rocephin, 2400cc LR, and 975mg Tylenol.     Interval: this am feeling better asking when will he go home       PAST MEDICAL & SURGICAL HISTORY:    DM (diabetes mellitus), type 2  BPH (benign prostatic hyperplasia)  HLD (hyperlipidemia)  Anxiety and depression  History of tonsillectomy    Social History:  Former smoker.   Denies drinking and drug use. (29 Apr 2022 14:15)      MEDICATIONS  (STANDING):  azithromycin  IVPB 500 milliGRAM(s) IV Intermittent every 24 hours  benztropine 1 milliGRAM(s) Oral two times a day  cefTRIAXone   IVPB 1000 milliGRAM(s) IV Intermittent every 24 hours  chlorhexidine 4% Liquid 1 Application(s) Topical <User Schedule>  dextrose 5%. 1000 milliLiter(s) (50 mL/Hr) IV Continuous <Continuous>  dextrose 5%. 1000 milliLiter(s) (100 mL/Hr) IV Continuous <Continuous>  dextrose 50% Injectable 25 Gram(s) IV Push once  dextrose 50% Injectable 12.5 Gram(s) IV Push once  dextrose 50% Injectable 25 Gram(s) IV Push once  escitalopram 10 milliGRAM(s) Oral daily  fenofibrate Tablet 145 milliGRAM(s) Oral daily  glucagon  Injectable 1 milliGRAM(s) IntraMuscular once  heparin   Injectable 5000 Unit(s) SubCutaneous every 8 hours  insulin lispro (ADMELOG) corrective regimen sliding scale   SubCutaneous three times a day before meals  lactated ringers. 1000 milliLiter(s) (100 mL/Hr) IV Continuous <Continuous>  pantoprazole    Tablet 40 milliGRAM(s) Oral before breakfast  saccharomyces boulardii 250 milliGRAM(s) Oral two times a day  simvastatin 20 milliGRAM(s) Oral at bedtime  tamsulosin 0.4 milliGRAM(s) Oral at bedtime    Vital Signs Last 24 Hrs  T(C): 36.1 (30 Apr 2022 05:18), Max: 39.4 (29 Apr 2022 08:13)  T(F): 97 (30 Apr 2022 05:18), Max: 103 (29 Apr 2022 08:13)  HR: 65 (30 Apr 2022 05:18) (65 - 92)  BP: 136/63 (30 Apr 2022 05:18) (105/55 - 136/63)  BP(mean): --  RR: 18 (30 Apr 2022 05:18) (18 - 20)  SpO2: 96% (30 Apr 2022 05:18) (91% - 97%)  MEDICATIONS  (PRN):  acetaminophen     Tablet .. 650 milliGRAM(s) Oral every 6 hours PRN Temp greater or equal to 38C (100.4F), Mild Pain (1 - 3)  dextrose Oral Gel 15 Gram(s) Oral once PRN Blood Glucose LESS THAN 70 milliGRAM(s)/deciliter    CAPILLARY BLOOD GLUCOSE      POCT Blood Glucose.: 126 mg/dL (30 Apr 2022 07:37)  POCT Blood Glucose.: 107 mg/dL (29 Apr 2022 21:30)  POCT Blood Glucose.: 132 mg/dL (29 Apr 2022 16:24)  POCT Blood Glucose.: 204 mg/dL (29 Apr 2022 08:02)    PHYSICAL EXAM:     General: Lying in bed, no acute distress   HEAD:  Atraumatic, Normocephalic  NECK: Supple  CHEST/LUNG: Clear to auscultation bilaterally; No rales or wheezing, mild right sided crackles  HEART: S1,S2 Regular rate and rhythm; No murmurs, rubs, or gallops  ABDOMEN: Soft, nontender, nondistended, no rebound tenderness; +BS   EXTREMITIES:  2+ peripheral pulses bilaterally and symmetrically, no clubbing, cyanosis, or edema  SKIN: No rashes or lesions  Neurology: non focal                           16.1   13.35 )-----------( 308      ( 29 Apr 2022 08:08 )             46.4     04-29    140  |  102  |  28<H>  ----------------------------<  182<H>  4.8   |  21  |  2.0<H>    Ca    10.0      29 Apr 2022 08:08    TPro  7.4  /  Alb  4.6  /  TBili  0.5  /  DBili  x   /  AST  47<H>  /  ALT  34  /  AlkPhos  37  04-29      Lactate, Blood: 3.9: Elevated lactate. Consider ordering follow-up lactate to trend. mmol/L (04.29.22 @ 18:54)       Historical Values  Lactate, Blood: 3.9: Elevated lactate. Consider ordering follow-up lactate to trend. mmol/L (04.29.22 @ 18:54)   Lactate, Blood: 4.5: Critical value:TYPE:(C=Critical, N=Notification, A=Abnormal) A   TESTS: _LACT   DATE/TIME CALLED: _04/29/2022 11:20:13 EDT   CALLED TO: SANDY DE JESUS   READ BACK (2 Patient Identifiers)(Y/N): _Y   READ BACK VALUES (Y/N): _Y   CALLED BY: _AA mmol/L (04.29.22 @ 10:30)   ACC: [id number] EXAM:  XR CHEST PORTABLE URGENT 1V                          PROCEDURE DATE:  04/29/2022          INTERPRETATION:  Clinical History / Reason for exam: Sepsis    Comparison : Chest radiograph March 28, 2017.    Technique/Positioning: Frontal view of the chest.    Findings:    Support devices: None.    Cardiac/mediastinum/hilum: Is atherosclerotic.    Lung parenchyma/Pleura: Extensive infiltrates of the right lung. Left   lung is clear.    Skeleton/soft tissues: Degenerative changes    Impression:    Extensive infiltrates in the right lung    --- End of Report ---            CHERYL RIVERA MD; Attending Radiologist  This document has been electronically signed. Apr 29 2022 11:34AM      
89M w/ PMH DMT2, BPH, HLD being admitted for PNA and DEWAYNE. Pt reports weakness and chills/shaking upon waking up this morning. Pt also reports dry cough overnight. Denies SOB. Denies HA, dizziness, SOB, chest pain, NVD, abdominal pain, change in urination and change in BM.     In ED, pt has tmax: 103 w/ WBC: 13.3. BP: 106/56, HR: 91, SPO2 88% on RA --> 97% on 3L. Lactate: 4.5. BUN/cr: 28/2.0. CTH: (-), CXR: extensive infiltrate in R lung. Pt was given 500mg IV zithromax, 1g IV rocephin, 2400cc LR, and 975mg Tylenol.     Interval: this am up and about ambulating       PAST MEDICAL & SURGICAL HISTORY:    DM (diabetes mellitus), type 2  BPH (benign prostatic hyperplasia)  HLD (hyperlipidemia)  Anxiety and depression  History of tonsillectomy    Social History:  Former smoker.   Denies drinking and drug use. (29 Apr 2022 14:15)      MEDICATIONS  (STANDING):  azithromycin  IVPB 500 milliGRAM(s) IV Intermittent every 24 hours  benztropine 1 milliGRAM(s) Oral two times a day  cefTRIAXone   IVPB 1000 milliGRAM(s) IV Intermittent every 24 hours  chlorhexidine 4% Liquid 1 Application(s) Topical <User Schedule>  dextrose 5%. 1000 milliLiter(s) (50 mL/Hr) IV Continuous <Continuous>  dextrose 5%. 1000 milliLiter(s) (100 mL/Hr) IV Continuous <Continuous>  dextrose 50% Injectable 25 Gram(s) IV Push once  dextrose 50% Injectable 12.5 Gram(s) IV Push once  dextrose 50% Injectable 25 Gram(s) IV Push once  escitalopram 10 milliGRAM(s) Oral daily  fenofibrate Tablet 145 milliGRAM(s) Oral daily  glucagon  Injectable 1 milliGRAM(s) IntraMuscular once  heparin   Injectable 5000 Unit(s) SubCutaneous every 8 hours  insulin lispro (ADMELOG) corrective regimen sliding scale   SubCutaneous three times a day before meals  lactated ringers. 1000 milliLiter(s) (100 mL/Hr) IV Continuous <Continuous>  pantoprazole    Tablet 40 milliGRAM(s) Oral before breakfast  saccharomyces boulardii 250 milliGRAM(s) Oral two times a day  simvastatin 20 milliGRAM(s) Oral at bedtime  tamsulosin 0.4 milliGRAM(s) Oral at bedtime    Vital Signs Last 24 Hrs  T(C): 35.8 (01 May 2022 05:26), Max: 37.3 (30 Apr 2022 21:05)  T(F): 96.5 (01 May 2022 05:26), Max: 99.2 (30 Apr 2022 21:05)  HR: 68 (01 May 2022 05:26) (68 - 79)  BP: 127/72 (01 May 2022 05:26) (127/72 - 163/70)  BP(mean): --  RR: 18 (01 May 2022 05:26) (18 - 18)  SpO2: 96% (30 Apr 2022 18:51) (96% - 96%)    CAPILLARY BLOOD GLUCOSE      POCT Blood Glucose.: 130 mg/dL (01 May 2022 07:30)  POCT Blood Glucose.: 107 mg/dL (30 Apr 2022 21:36)  POCT Blood Glucose.: 113 mg/dL (30 Apr 2022 16:28)  POCT Blood Glucose.: 122 mg/dL (30 Apr 2022 11:34)      PHYSICAL EXAM:     General: Lying in bed, no acute distress   HEAD:  Atraumatic, Normocephalic  NECK: Supple  CHEST/LUNG: Clear to auscultation bilaterally; No rales or wheezing, mild right sided crackles  HEART: S1,S2 Regular rate and rhythm; No murmurs, rubs, or gallops  ABDOMEN: Soft, nontender, nondistended, no rebound tenderness; +BS   EXTREMITIES:  2+ peripheral pulses bilaterally and symmetrically, no clubbing, cyanosis, or edema  SKIN: No rashes or lesions  Neurology: non focal                           14.2   13.75 )-----------( 218      ( 30 Apr 2022 07:02 )             41.4                             16.1   13.35 )-----------( 308      ( 29 Apr 2022 08:08 )             46.4     04-30    139  |  103  |  24<H>  ----------------------------<  126<H>  4.1   |  22  |  1.3    Ca    8.7      30 Apr 2022 07:02    TPro  6.0  /  Alb  3.7  /  TBili  0.4  /  DBili  x   /  AST  252<H>  /  ALT  87<H>  /  AlkPhos  41  04-30 04-29    140  |  102  |  28<H>  ----------------------------<  182<H>  4.8   |  21  |  2.0<H>    Ca    10.0      29 Apr 2022 08:08    TPro  7.4  /  Alb  4.6  /  TBili  0.5  /  DBili  x   /  AST  47<H>  /  ALT  34  /  AlkPhos  37  04-29      Lactate, Blood: 3.9: Elevated lactate. Consider ordering follow-up lactate to trend. mmol/L (04.29.22 @ 18:54)       Historical Values  Lactate, Blood: 3.9: Elevated lactate. Consider ordering follow-up lactate to trend. mmol/L (04.29.22 @ 18:54)   Lactate, Blood: 4.5: Critical value:TYPE:(C=Critical, N=Notification, A=Abnormal) A   TESTS: _LACT   DATE/TIME CALLED: _04/29/2022 11:20:13 EDT   CALLED TO: SANDY DE JESUS   READ BACK (2 Patient Identifiers)(Y/N): _Y   READ BACK VALUES (Y/N): _Y   CALLED BY: ARINA mmol/L (04.29.22 @ 10:30)   ACC: [id number] EXAM:  XR CHEST PORTABLE URGENT 1V                          PROCEDURE DATE:  04/29/2022          INTERPRETATION:  Clinical History / Reason for exam: Sepsis    Comparison : Chest radiograph March 28, 2017.    Technique/Positioning: Frontal view of the chest.    Findings:    Support devices: None.    Cardiac/mediastinum/hilum: Is atherosclerotic.    Lung parenchyma/Pleura: Extensive infiltrates of the right lung. Left   lung is clear.    Skeleton/soft tissues: Degenerative changes    Impression:    Extensive infiltrates in the right lung    --- End of Report ---            CHERYL RIVERA MD; Attending Radiologist  This document has been electronically signed. Apr 29 2022 11:34AM

## 2022-05-01 NOTE — DISCHARGE NOTE PROVIDER - NSDCCPCAREPLAN_GEN_ALL_CORE_FT
PRINCIPAL DISCHARGE DIAGNOSIS  Diagnosis: Pneumonia  Assessment and Plan of Treatment:       SECONDARY DISCHARGE DIAGNOSES  Diagnosis: Hypoxia  Assessment and Plan of Treatment:     Diagnosis: Sepsis  Assessment and Plan of Treatment:     Diagnosis: DEWAYNE (acute kidney injury)  Assessment and Plan of Treatment:

## 2022-05-01 NOTE — DISCHARGE NOTE PROVIDER - HOSPITAL COURSE
81y male admitted with PNA, and sepsis was started on IV abx and fluid improved   11M w/ PMH DMT2, BPH, HLD being admitted for PNA and DEWAYNE.   In ED, pt has tmax: 103 w/ WBC: 13.3. BP: 106/56, HR: 91, SPO2 88% on RA --> 97% on 3L. Lactate: 4.5. BUN/cr: 28/2.0. CTH: (-), CXR: extensive infiltrate in R lung. Pt was given 500mg IV zithromax, 1g IV rocephin, 2400cc LR, and 975mg Tylenol.       - feeling better clincally improved for d/c     Problem/Plan - 1:  ·  Problem: Community acquired pneumonia.   ·  Plan: -  - Lactate elevated to 4.5   - s/p IV abx and 2.5L LR  -  IV antibiotics -  to po  - probiotic BID  - Incentive spirometry  - Supplemental O2 PRN  - Monitor SPO2    Problem/Plan - 2:  ·  Problem: DEWAYNE (acute kidney injury).   ·  Plan: -  - resolved back to baseline     Problem/Plan - 3:  ·  Problem: DM (diabetes mellitus), type 2.   ·  Plan: -  - restart metformin  - Monitor fingersticks  - Add SSI  - Consistent carb diet.    Problem/Plan - 4:  ·  Problem: BPH (benign prostatic hyperplasia).   ·  Plan: -  - Continue flomax.    Problem/Plan - 5:  ·  Problem: HLD (hyperlipidemia).   ·  Plan: -  - Lowfat diet  - Continue statin and fenofibrate.    Problem/Plan - 6:  ·  Problem: Anxiety and depression.   ·  Plan: -  - Continue escitalopram.    [facility] CPR     DISPOSITION HOME

## 2022-05-01 NOTE — PROGRESS NOTE ADULT - ASSESSMENT
11M w/ PMH DMT2, BPH, HLD being admitted for PNA and DEWAYNE.   In ED, pt has tmax: 103 w/ WBC: 13.3. BP: 106/56, HR: 91, SPO2 88% on RA --> 97% on 3L. Lactate: 4.5. BUN/cr: 28/2.0. CTH: (-), CXR: extensive infiltrate in R lung. Pt was given 500mg IV zithromax, 1g IV rocephin, 2400cc LR, and 975mg Tylenol.       - feeling better clincally improved for d/c     Problem/Plan - 1:  ·  Problem: Community acquired pneumonia.   ·  Plan: -  - Lactate elevated to 4.5   - s/p IV abx and 2.5L LR  -  IV antibiotics -  to po  - probiotic BID  - Incentive spirometry  - Supplemental O2 PRN  - Monitor SPO2    Problem/Plan - 2:  ·  Problem: DEWAYNE (acute kidney injury).   ·  Plan: -  - resolved back to baseline     Problem/Plan - 3:  ·  Problem: DM (diabetes mellitus), type 2.   ·  Plan: -  - restart metformin  - Monitor fingersticks  - Add SSI  - Consistent carb diet.    Problem/Plan - 4:  ·  Problem: BPH (benign prostatic hyperplasia).   ·  Plan: -  - Continue flomax.    Problem/Plan - 5:  ·  Problem: HLD (hyperlipidemia).   ·  Plan: -  - Lowfat diet  - Continue statin and fenofibrate.    Problem/Plan - 6:  ·  Problem: Anxiety and depression.   ·  Plan: -  - Continue escitalopram.    [facility] CPR     DISPOSITION HOME     
11M w/ PMH DMT2, BPH, HLD being admitted for PNA and DEWAYNE.   In ED, pt has tmax: 103 w/ WBC: 13.3. BP: 106/56, HR: 91, SPO2 88% on RA --> 97% on 3L. Lactate: 4.5. BUN/cr: 28/2.0. CTH: (-), CXR: extensive infiltrate in R lung. Pt was given 500mg IV zithromax, 1g IV rocephin, 2400cc LR, and 975mg Tylenol.       case was discussed with Tristin Crowell yesterday     - Admit to inpatient level of care - medicine  - AM labs pending   - Monitor vitals  - CHG bath  - Ambulate w/ assistance  - PT consult   - Consistent carb/lowfat diet  - VTE ppx: SQ heparin  - GI ppx: protonix   - Continue cogentin     Problem/Plan - 1:  ·  Problem: Community acquired pneumonia.   ·  Plan: -  - Lactate elevated to 4.5   - s/p IV abx and 2.5L LR  - Continue IV antibiotics - zithromax/ rocephin  - Start LR @ 100cc/hr  - Recheck lactate at 7pm   - Add probiotic BID  - Incentive spirometry  - Supplemental O2 PRN  - Monitor SPO2  - Pulmonary c/s   - Follow up blood cultures.    Problem/Plan - 2:  ·  Problem: DEWAYNE (acute kidney injury).   ·  Plan: -  - Baseline Cr: 1.4 ; now Cr: 2.0  - S/p 2.5L LR   - Trend Scr.    Problem/Plan - 3:  ·  Problem: DM (diabetes mellitus), type 2.   ·  Plan: -  - Hold metformin  - Monitor fingersticks  - Add SSI  - Consistent carb diet.    Problem/Plan - 4:  ·  Problem: BPH (benign prostatic hyperplasia).   ·  Plan: -  - Continue flomax.    Problem/Plan - 5:  ·  Problem: HLD (hyperlipidemia).   ·  Plan: -  - Lowfat diet  - Continue statin and fenofibrate.    Problem/Plan - 6:  ·  Problem: Anxiety and depression.   ·  Plan: -  - Continue escitalopram.    [facility] CPR     DISPOSITION HOME

## 2022-05-02 PROBLEM — F41.9 ANXIETY DISORDER, UNSPECIFIED: Chronic | Status: ACTIVE | Noted: 2022-04-29

## 2022-05-02 PROBLEM — E78.5 HYPERLIPIDEMIA, UNSPECIFIED: Chronic | Status: ACTIVE | Noted: 2022-04-29

## 2022-05-02 PROBLEM — N40.0 BENIGN PROSTATIC HYPERPLASIA WITHOUT LOWER URINARY TRACT SYMPTOMS: Chronic | Status: ACTIVE | Noted: 2022-04-29

## 2022-05-02 PROBLEM — E11.9 TYPE 2 DIABETES MELLITUS WITHOUT COMPLICATIONS: Chronic | Status: ACTIVE | Noted: 2022-04-29

## 2022-05-03 ENCOUNTER — APPOINTMENT (OUTPATIENT)
Dept: CARE COORDINATION | Facility: HOME HEALTH | Age: 82
End: 2022-05-03
Payer: MEDICARE

## 2022-05-03 VITALS
OXYGEN SATURATION: 97 % | SYSTOLIC BLOOD PRESSURE: 130 MMHG | RESPIRATION RATE: 16 BRPM | DIASTOLIC BLOOD PRESSURE: 70 MMHG | HEART RATE: 70 BPM

## 2022-05-03 DIAGNOSIS — E11.9 TYPE 2 DIABETES MELLITUS W/OUT COMPLICATIONS: ICD-10-CM

## 2022-05-03 DIAGNOSIS — N40.0 BENIGN PROSTATIC HYPERPLASIA WITHOUT LOWER URINARY TRACT SYMPMS: ICD-10-CM

## 2022-05-03 DIAGNOSIS — L25.9 UNSPECIFIED CONTACT DERMATITIS, UNSPECIFIED CAUSE: ICD-10-CM

## 2022-05-03 DIAGNOSIS — J18.9 PNEUMONIA, UNSPECIFIED ORGANISM: ICD-10-CM

## 2022-05-03 PROCEDURE — 99496 TRANSJ CARE MGMT HIGH F2F 7D: CPT

## 2022-05-03 RX ORDER — HYDROCORTISONE 10 MG/ML
1 LOTION TOPICAL 3 TIMES DAILY
Qty: 1 | Refills: 0 | Status: ACTIVE | COMMUNITY
Start: 2022-05-03 | End: 1900-01-01

## 2022-05-04 PROBLEM — J18.9 PNEUMONIA: Status: ACTIVE | Noted: 2022-05-04

## 2022-05-04 PROBLEM — N40.0 BPH (BENIGN PROSTATIC HYPERPLASIA): Status: ACTIVE | Noted: 2022-05-04

## 2022-05-04 PROBLEM — E11.9 DIABETES: Status: ACTIVE | Noted: 2022-05-04

## 2022-05-04 PROBLEM — L25.9 CONTACT DERMATITIS: Status: ACTIVE | Noted: 2022-05-04

## 2022-05-04 LAB
CULTURE RESULTS: SIGNIFICANT CHANGE UP
CULTURE RESULTS: SIGNIFICANT CHANGE UP
SPECIMEN SOURCE: SIGNIFICANT CHANGE UP
SPECIMEN SOURCE: SIGNIFICANT CHANGE UP

## 2022-05-04 RX ORDER — CEFDINIR 300 MG/1
300 CAPSULE ORAL
Refills: 0 | Status: ACTIVE | COMMUNITY

## 2022-05-04 RX ORDER — ESCITALOPRAM OXALATE 10 MG/1
10 TABLET, FILM COATED ORAL DAILY
Refills: 0 | Status: ACTIVE | COMMUNITY

## 2022-05-04 RX ORDER — METFORMIN HYDROCHLORIDE 500 MG/1
500 TABLET, COATED ORAL
Refills: 0 | Status: ACTIVE | COMMUNITY

## 2022-05-04 RX ORDER — FENOFIBRATE 145 MG/1
145 TABLET, COATED ORAL DAILY
Refills: 0 | Status: ACTIVE | COMMUNITY

## 2022-05-04 RX ORDER — TAMSULOSIN HCL 0.4 MG
0.4 CAPSULE ORAL
Refills: 0 | Status: ACTIVE | COMMUNITY

## 2022-05-04 RX ORDER — SIMVASTATIN 10 MG/1
10 TABLET, FILM COATED ORAL
Refills: 0 | Status: ACTIVE | COMMUNITY

## 2022-05-04 NOTE — COUNSELING
[de-identified] : Pt was informed about CN’s role/ [facility] program and overview of transitional care reviewed with patient. Pt educated on topics of importance such as compliance with prescribed medication regimen, PNA action plan and escalation process, adequate hydration, and proper diet. Pt encouraged calling CN with any issues, concerns or questions, also educated to notify CN if experiencing CP, SOB, cough, increased mucus production, increased use of rescue inhaler, fever, chills, fatigue, “chest cold symptoms” dizziness, lightheadedness, n/v/d/c, swelling to extremities and/or any signs of exacerbation/flare as reviewed. Reassurance provided. Will continue to monitor\par \par \par \par \par

## 2022-05-04 NOTE — PHYSICAL EXAM
[No Acute Distress] : no acute distress [Well-Appearing] : well-appearing [No Respiratory Distress] : no respiratory distress  [No Accessory Muscle Use] : no accessory muscle use [Clear to Auscultation] : lungs were clear to auscultation bilaterally [Regular Rhythm] : with a regular rhythm [Normal S1, S2] : normal S1 and S2 [No Edema] : there was no peripheral edema [Soft] : abdomen soft [Non Tender] : non-tender [Normal Affect] : the affect was normal [Normal Insight/Judgement] : insight and judgment were intact [Normal Sclera/Conjunctiva] : normal sclera/conjunctiva [Normal Rate] : the respiratory rate was normal [Normal Rhythm/Effort] : normal respiratory rhythm and effort [Clear Bilaterally] : the lungs were clear to auscultation bilaterally [Rales / Crackles Bilateral] : no rales or crackles were heard [Rhonchi Bilateral] : no rhonchi were heard [Coordination Grossly Intact] : coordination grossly intact [No Focal Deficits] : no focal deficits [de-identified] : erythematous rash noted to rt FA

## 2022-05-04 NOTE — HISTORY OF PRESENT ILLNESS
[FreeTextEntry1] : follow up hospital discharge for PNA [de-identified] : Patient is a 71 y/o male enrolled in the [facility] T[facility] program s/p a recent admission for PNA 4/29- 5/1 he has a PMH DMT, BPH, HLD he was treated with ABRX/ fluids and improved clinically and dc home w/o HCS. On arrival to home patient is alert denies c/p, sob, fever, cough , NVD c/o rash/ itching to Rt FA where bandage was placed for IV. PCP follow up in place. Patient was contacted by RN from TCM and medication reconciliation was done with in 48 hours of discharge\par \par

## 2022-05-04 NOTE — PLAN
[FreeTextEntry1] : 1)PNA- Complete Cefdnir BID\par Adhere to all medications including demonstrating proper use of nebulizers.\par Increase activity as tolerated and maintain optimal activity levels. Continue coughing and deep breathing exercises including use of Incentive Spirometry.\par Receive routine pneumococcal and influenza vaccinations.\par Maintain proper nutrition and adequate hydration.\par Notify NP for worsening symptoms including fever, chills, SOB, CP, increased cough and secretions.\par 2)Contact Dermatitis: Patient advise to take home dose of Xyzal used prn, and apply cortisone to R FA\par 3)DM- low carb diet , BS monitoring, c/w metformin \par 4) BPH- c/w flomax\par Follow up with MD within 7 days of discharge.\par \par \par

## 2022-05-04 NOTE — ASSESSMENT
[FreeTextEntry1] : Patient is a 73 y/o male enrolled in the [facility] program s/p a recent admission for PNA 4/29- 5/1 he has a PMH DMT, BPH, HLD he was treated with ABRX/ fluids and improved clinically and dc home w/o HCS. On arrival to home patient is alert denies c/p, sob, fever, cough , NVD c/o rash/ itching to Rt FA where bandage was placed for IV. PCP follow up in place

## 2022-05-06 DIAGNOSIS — F32.A DEPRESSION, UNSPECIFIED: ICD-10-CM

## 2022-05-06 DIAGNOSIS — J18.9 PNEUMONIA, UNSPECIFIED ORGANISM: ICD-10-CM

## 2022-05-06 DIAGNOSIS — A41.9 SEPSIS, UNSPECIFIED ORGANISM: ICD-10-CM

## 2022-05-06 DIAGNOSIS — E11.9 TYPE 2 DIABETES MELLITUS WITHOUT COMPLICATIONS: ICD-10-CM

## 2022-05-06 DIAGNOSIS — Z87.891 PERSONAL HISTORY OF NICOTINE DEPENDENCE: ICD-10-CM

## 2022-05-06 DIAGNOSIS — N40.0 BENIGN PROSTATIC HYPERPLASIA WITHOUT LOWER URINARY TRACT SYMPTOMS: ICD-10-CM

## 2022-05-06 DIAGNOSIS — Z79.84 LONG TERM (CURRENT) USE OF ORAL HYPOGLYCEMIC DRUGS: ICD-10-CM

## 2022-05-06 DIAGNOSIS — R09.02 HYPOXEMIA: ICD-10-CM

## 2022-05-06 DIAGNOSIS — N17.9 ACUTE KIDNEY FAILURE, UNSPECIFIED: ICD-10-CM

## 2022-05-06 DIAGNOSIS — Z20.822 CONTACT WITH AND (SUSPECTED) EXPOSURE TO COVID-19: ICD-10-CM

## 2022-05-06 DIAGNOSIS — E78.5 HYPERLIPIDEMIA, UNSPECIFIED: ICD-10-CM

## 2022-05-06 DIAGNOSIS — F41.9 ANXIETY DISORDER, UNSPECIFIED: ICD-10-CM

## 2023-02-05 NOTE — PHYSICAL THERAPY INITIAL EVALUATION ADULT - PREDICTED DURATION OF THERAPY (DAYS/WKS), PT EVAL
FAMILY HISTORY:  Grandparent  Still living? Unknown  Family history of diabetes mellitus, Age at diagnosis: Age Unknown     until duration of hospital stay

## 2023-05-18 NOTE — PHYSICAL THERAPY INITIAL EVALUATION ADULT - BALANCE DISTURBANCE, IDENTIFIED IMPAIRMENT CONTRIBUTE, REHAB EVAL
Please advise below message.      PHQ and RODRICK completed as well/    Recent Review Flowsheet Data     Date 5/18/2023    Adult PHQ 2 Score 3    Adult PHQ 2 Interpretation Further screening needed    Little interest or pleasure in activity? Several days    Feeling down, depressed or hopeless? More than half the days    Adult PHQ 9 Score 12    Adult PHQ 9 Interpretation Moderate Depression    Trouble falling or staying asleep or sleeping all the time? Several days    Feeling tired or having little energy? Several days    Poor appetite or overeating? Several days    Feeling bad about yourself or that you are a failure or have let yourself or family down? More than half the days    Trouble concentrating on things such as reading the newspaper or watching TV? Nearly every day    Moving or speaking slowly that other people have noticed or the opposite - being so fidgety or restless that you have been moving around a lot more than usual? Several days    Thoughts that you would be better off dead or of hurting yourself in some way? Not at all        Generalized Anxiety Disorder (GAD7)    Over the last 2 weeks, how often have you been bothered by the following problems?   Score: 12    Score:  0-4 minimal symptoms 10-14 moderate symptoms   5-9 mild symptoms 15-21 severe symptoms      1.  Feeling nervous, anxious or on edge Several days   2.  Not being able to stop or control worrying Several days   3.  Worrying too much about different things More than half the days   4.  Trouble relaxing Nearly every day   5.  Being so restless that it's hard to sit still More than half the days   6.  Becoming easily annoyed or irritable Nearly every day   7.  Feeling afraid as if something awful might happen Not at all            If you checked off any problems, how difficult have these made it for you to do your work, take care of things at home, or get along with other people? Very difficult           impaired postural control/decreased strength

## 2024-02-02 NOTE — ED PROVIDER NOTE - ADMIT DISPOSITION PRESENT ON ADMISSION SEPSIS Q1 - RE-EVALUATED PATIENT FLUID AND VITAL SIGNS
100% of the time I have re-evaluated the patient's fluid status and reviewed vital signs. Clinical perfusion assessment was performed.

## 2024-03-29 NOTE — PHYSICAL THERAPY INITIAL EVALUATION ADULT - PERTINENT HX OF CURRENT PROBLEM, REHAB EVAL
Pt admitted with diagnosis of pneumonia, hypoxia, and sepsis.
Pt seen and examined. Pt feeling better today - pain is improved, able to move his L arm much better today, tolerating diet, no CP or SOB.   No events on tele; remain in sinus 70s    PHYSICAL EXAM:  GENERAL: NAD, well-developed  HEAD:  Atraumatic, normocephalic  EYES: L eye swelling improved, conjunctiva and sclera clear  NECK: Supple, no JVD  CHEST/LUNG: Clear to auscultation bilaterally; no wheezing or rales  HEART: Regular rate and rhythm; no murmurs, no LE edema   ABDOMEN: Soft, nontender, nondistended; bowel sounds present  EXTREMITIES:  2+ Peripheral Pulses, no clubbing, cyanosis; +L shoulder pain, but able to range fully   PSYCH: AAOx3, calm affect, not anxious  SKIN: No rashes or lesions  MUSCULOSKELETAL: +L paraspinal tenderness     No cardiac etiology of syncope noted, TTE and CTA coronaries WNL  Pt instructed to f/u with PMD for Holter findings   Pt instructed to start ASA 81mg daily and lipitor 40mg daily   Discussed all the above with his wife over phone     stable for d/c home

## 2024-11-07 NOTE — ED PROVIDER NOTE - CADM POA URETHRAL CATHETER
PEP discussed NP's symptoms with RN. PEP able to find and hold sooner appt for patient. RN unable to find sooner visit, advised PEP to schedule appt for patient, as RN unable to Triage pt due to being a new patient. No further questions at this time.    No

## 2025-01-12 NOTE — ED PROVIDER NOTE - INTERPRETATION
Evacuation of abdominal wall hematoma,  opened prior left abdominal wall incision, approx 200cc of old hematoma evacuated, mild fat necrosis, no obvious purulent drainage.  Pulse-A-Vac lavage performed and two tere drains placed(19Fr, 15Fr).
Sinus tachycardia

## 2025-02-12 NOTE — H&P ADULT - HISTORY OF PRESENT ILLNESS
Pt is a 81M w/ PMH DMT2, BPH, HLD being admitted for PNA and DEWAYNE. Pt reports weakness and chills/shaking upon waking up this morning. Pt also reports dry cough overnight. Denies SOB. Denies HA, dizziness, SOB, chest pain, NVD, abdominal pain, change in urination and change in BM.     In ED, pt has tmax: 103 w/ WBC: 13.3. BP: 106/56, HR: 91, SPO2 88% on RA --> 97% on 3L. Lactate: 4.5. BUN/cr: 28/2.0. CTH: (-), CXR: extensive infiltrate in R lung. Pt was given 500mg IV zithromax, 1g IV rocephin, 2400cc LR, and 975mg Tylenol.  13-Feb-2025 00:57